# Patient Record
Sex: MALE | Race: WHITE | URBAN - METROPOLITAN AREA
[De-identification: names, ages, dates, MRNs, and addresses within clinical notes are randomized per-mention and may not be internally consistent; named-entity substitution may affect disease eponyms.]

---

## 2017-03-09 ENCOUNTER — OFFICE VISIT (OUTPATIENT)
Dept: URGENT CARE | Facility: URGENT CARE | Age: 77
End: 2017-03-09
Payer: MEDICARE

## 2017-03-09 VITALS — DIASTOLIC BLOOD PRESSURE: 88 MMHG | OXYGEN SATURATION: 99 % | HEART RATE: 65 BPM | SYSTOLIC BLOOD PRESSURE: 142 MMHG

## 2017-03-09 DIAGNOSIS — I10 BENIGN ESSENTIAL HYPERTENSION: Primary | ICD-10-CM

## 2017-03-09 PROCEDURE — 99203 OFFICE O/P NEW LOW 30 MIN: CPT | Performed by: NURSE PRACTITIONER

## 2017-03-09 NOTE — PROGRESS NOTES
"  SUBJECTIVE:                                                    Denis \"Que\" DARREN Staley is a 76 year old male who is accompanied by his wife presents to the urgent care clinic today for the following health issues:  Que resides in [place] [facility] and is visiting [place] to see family.  He is returning to [place] tomorrow.     Hypertension      Outpatient blood pressures checked at home by the patient's wife this morning.  Initial reading at 0800 was 167/102.  Que took his daily dose of lisinopril 10 mg and metoprolol 75 mg at 0800.  BP was checked again at 1000 by Que's wife. That reading was 155/100.  Que and his wife remained concerned about his BP being elevated so Que's wife gave him a second dose of lisinopril 10 mg at 1000.  Que and his wife remained concerned about the BP readings so they decided to come to urgent care.        Amount of exercise or physical activity: Walks 6-7 days/week    Problems taking medications regularly: No    Medication side effects: None    PMH: Hypertension, atrial fib (has a pacemaker) and mild dementia.   Additional history: as documented    There is no problem list on file for this patient.    No past surgical history on file.    Social History   Substance Use Topics     Smoking status: Former Smoker     Smokeless tobacco: Not on file     Alcohol use Not on file     No family history on file.      Current Outpatient Prescriptions   Medication Sig Dispense Refill     LISINOPRIL PO Take 10 mg by mouth daily       METFORMIN HCL PO Take 500 mg by mouth 2 times daily (with meals)       WARFARIN SODIUM PO Take 1 mg by mouth take 1 tablet by mouth daily, on Mon and Fri take 1 1/2 tabs by mouth       METOPROLOL SUCCINATE ER PO Take 75 mg by mouth daily       Memantine HCl (NAMENDA PO)          ROS:  Constitutional, HEENT, cardiovascular, pulmonary, GI and  systems are negative, except as otherwise noted.    OBJECTIVE:                                                    /88 " (BP Location: Right arm, Patient Position: Chair, Cuff Size: Adult Regular)  Pulse 65  SpO2 99%.     Last BP reading taken in  by myself was 126/82 (right arm, sitting position, standard adult cuff size).   There is no height or weight on file to calculate BMI.     GENERAL: Healthy, alert and in no distress  RESP: Lungs clear to auscultation - no rales, rhonchi or wheezes  CV: Regular rate and rhythm, normal S1 S2, no S3 or S4, no murmur, click or rub    Diagnostic Test Results:  None      ASSESSMENT:                                                      Hypertension concerns, benign    PLAN:                                                      Reassured the patient and his wife that his BP is stable at this time. Education provided about normal, mild, moderate and severe levels of BP elevation.   Instructed to take the prescribed doses of lisinopril and metoprolol. Do not take extra doses unless instructed to do so by a healthcare professional.  Follow up with PCP in [place] within the next 2-3 days.   Educated the patient and his wife regarding availability of telephone access to an on-call clinician after normal clinic hours at their primary care clinic and telephone triage nurse availability through their health insurance company.  The patient and his wife verbalized understanding of and agreement with this plan.       ARMANDO Funk, CNP  [facility]

## 2017-03-09 NOTE — NURSING NOTE
Chief Complaint   Patient presents with     Urgent Care     Hypertension     onset today, BP readings of 167/102 and 155/100.  Pt history of afib, pt out of town concerned of BP readings. Pt reports feeling different.  Pt on lisinopril, metorprolol, pt reports taking a 2nd dose of lisinopril.        Initial BP (!) 140/96 (BP Location: Right arm, Patient Position: Chair, Cuff Size: Adult Regular)  Pulse 65  SpO2 99% There is no height or weight on file to calculate BMI.  Medication Reconciliation: complete      Nadeen Starr CMA                                3/9/2017 1:15 PM

## 2018-05-03 DIAGNOSIS — Z79.01 LONG-TERM (CURRENT) USE OF ANTICOAGULANTS: Primary | ICD-10-CM

## 2018-05-03 DIAGNOSIS — I48.91 ATRIAL FIBRILLATION (H): ICD-10-CM

## 2018-05-03 LAB — INR PPP: 2.1 (ref 0.86–1.14)

## 2018-05-03 PROCEDURE — 85610 PROTHROMBIN TIME: CPT | Performed by: INTERNAL MEDICINE

## 2018-05-03 PROCEDURE — 36416 COLLJ CAPILLARY BLOOD SPEC: CPT | Performed by: INTERNAL MEDICINE
